# Patient Record
Sex: FEMALE | Race: WHITE | Employment: PART TIME | URBAN - METROPOLITAN AREA
[De-identification: names, ages, dates, MRNs, and addresses within clinical notes are randomized per-mention and may not be internally consistent; named-entity substitution may affect disease eponyms.]

---

## 2020-12-06 PROBLEM — O48.0 POST-DATES PREGNANCY: Status: ACTIVE | Noted: 2020-12-06

## 2020-12-06 PROBLEM — Z37.9 NORMAL LABOR: Status: ACTIVE | Noted: 2020-12-06

## 2020-12-08 PROBLEM — O48.0 POST-DATES PREGNANCY: Status: RESOLVED | Noted: 2020-12-06 | Resolved: 2020-12-08

## 2020-12-08 PROBLEM — Z37.9 NORMAL LABOR: Status: RESOLVED | Noted: 2020-12-06 | Resolved: 2020-12-08

## 2021-10-28 ENCOUNTER — OFFICE VISIT (OUTPATIENT)
Dept: ORTHOPEDIC SURGERY | Age: 41
End: 2021-10-28
Payer: MEDICAID

## 2021-10-28 ENCOUNTER — HOSPITAL ENCOUNTER (OUTPATIENT)
Dept: OCCUPATIONAL MEDICINE | Age: 41
Discharge: HOME | End: 2021-10-28
Attending: FAMILY MEDICINE

## 2021-10-28 VITALS
HEART RATE: 76 BPM | WEIGHT: 173.2 LBS | RESPIRATION RATE: 16 BRPM | HEIGHT: 72 IN | BODY MASS INDEX: 23.46 KG/M2 | OXYGEN SATURATION: 94 %

## 2021-10-28 DIAGNOSIS — M77.12 LATERAL EPICONDYLITIS OF LEFT ELBOW: Primary | ICD-10-CM

## 2021-10-28 DIAGNOSIS — M77.12 LATERAL EPICONDYLITIS OF LEFT ELBOW: ICD-10-CM

## 2021-10-28 PROCEDURE — 99204 OFFICE O/P NEW MOD 45 MIN: CPT | Performed by: FAMILY MEDICINE

## 2021-10-28 RX ORDER — DICLOFENAC SODIUM 10 MG/G
2 GEL TOPICAL
Qty: 200 G | Refills: 2 | Status: SHIPPED | OUTPATIENT
Start: 2021-10-28

## 2021-10-28 NOTE — PATIENT INSTRUCTIONS
Search YouTube for my channel:    Dr. Jacob Redman      Tennis Elbow: Exercises  Introduction  Here are some examples of exercises for you to try. The exercises may be suggested for a condition or for rehabilitation. Start each exercise slowly. Ease off the exercises if you start to have pain. You will be told when to start these exercises and which ones will work best for you. How to do the exercises  Wrist flexor stretch    1. Extend your arm in front of you with your palm up. 2. Bend your wrist, pointing your hand toward the floor. 3. With your other hand, gently bend your wrist farther until you feel a mild to moderate stretch in your forearm. 4. Hold for at least 15 to 30 seconds. Repeat 2 to 4 times. Wrist extensor stretch    1. Repeat steps 1 to 4 of the stretch above but begin with your extended hand palm down. Ball or sock squeeze    1. Hold a tennis ball (or a rolled-up sock) in your hand. 2. Make a fist around the ball (or sock) and squeeze. 3. Hold for about 6 seconds, and then relax for up to 10 seconds. 4. Repeat 8 to 12 times. 5. Switch the ball (or sock) to your other hand and do 8 to 12 times. Wrist deviation    1. Sit so that your arm is supported but your hand hangs off the edge of a flat surface, such as a table. 2. Hold your hand out like you are shaking hands with someone. 3. Move your hand up and down. 4. Repeat this motion 8 to 12 times. 5. Switch arms. 6. Try to do this exercise twice with each hand. Wrist curls    1. Place your forearm on a table with your hand hanging over the edge of the table, palm up. 2. Place a 1- to 2-pound weight in your hand. This may be a dumbbell, a can of food, or a filled water bottle. 3. Slowly raise and lower the weight while keeping your forearm on the table and your palm facing up. 4. Repeat this motion 8 to 12 times. 5. Switch arms, and do steps 1 through 4.  6. Repeat with your hand facing down toward the floor. Switch arms.   Biceps curls    1. Sit leaning forward with your legs slightly spread and your left hand on your left thigh. 2. Place your right elbow on your right thigh, and hold the weight with your forearm horizontal.  3. Slowly curl the weight up and toward your chest.  4. Repeat this motion 8 to 12 times. 5. Switch arms, and do steps 1 through 4. Follow-up care is a key part of your treatment and safety. Be sure to make and go to all appointments, and call your doctor if you are having problems. It's also a good idea to know your test results and keep a list of the medicines you take. Where can you learn more? Go to http://www.gray.com/  Enter [address]215 in the search box to learn more about \"Tennis Elbow: Exercises. \"  Current as of: July 1, 2021               Content Version: 13.0  © 7275-9103 [employer]. Care instructions adapted under license by [employer] (which disclaims liability or warranty for this information). If you have questions about a medical condition or this instruction, always ask your healthcare professional. [address] any warranty or liability for your use of this information.

## 2021-10-28 NOTE — PROGRESS NOTES
AVS reviewed: YES  HEP: AT reviewed  Resources Provided: YES Printed Photos  Patient questions/concerns answered: NO pt declined questions or concerns   Patient verbalized understanding of treatment plan: YES

## 2021-10-28 NOTE — PROGRESS NOTES
HISTORY OF PRESENT ILLNESS    Arcelia Ayala 1980 is a 37y.o. year old female comes in today as new patient for: left elbow pain    Patients symptoms have been present for 6 weeks after injury rock climbing. 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 Pain level 2/10 left elbow lateral, worse with lift palm down. It is unchanged with rest/ice. IMAGING: XR left elbow pending    Past Surgical History:   Procedure Laterality Date    HX BREAST AUGMENTATION  2000    UPPER ARM/ELBOW SURGERY UNLISTED      bicept tendon repair     Social History     Socioeconomic History    Marital status: UNKNOWN     Spouse name: Not on file    Number of children: Not on file    Years of education: Not on file    Highest education level: Not on file   Tobacco Use    Smoking status: Never Smoker    Smokeless tobacco: Never Used   Vaping Use    Vaping Use: Never used   Substance and Sexual Activity    Alcohol use: Not Currently    Drug use: Not Currently     Social Determinants of Health     Financial Resource Strain:     Difficulty of Paying Living Expenses:    Food Insecurity:     Worried About Running Out of Food in the Last Year:     Ran Out of Food in the Last Year:    Transportation Needs:     Lack of Transportation (Medical):  Lack of Transportation (Non-Medical):    Physical Activity:     Days of Exercise per Week:     Minutes of Exercise per Session:    Stress:     Feeling of Stress :    Social Connections:     Frequency of Communication with Friends and Family:     Frequency of Social Gatherings with Friends and Family:     Attends Buddhist Services:     Active Member of Clubs or Organizations:     Attends Club or Organization Meetings:     Marital Status:       Current Outpatient Medications   Medication Sig Dispense Refill    prenatal multivit-ca-min-fe-fa tab Take  by mouth. History reviewed. No pertinent past medical history.   No family history on file.      ROS:  No swell, bruise, numb      Objective:  Pulse 76   Resp 16   Ht 6' (1.829 m)   Wt 173 lb 3.2 oz (78.6 kg)   SpO2 94%   BMI 23.49 kg/m²   NEURO:  Sensation intact light touch upper and lower extremities. Biceps & Triceps reflexes +2/4 bilaterally. right hand dominant. Spurling Negative bilateral   M/S:  left elbow/wrist: Negative angel tenderness. Phalen's negative. Tinel's negative. Strength +5/5 bilateral .  Piano key sign Negative bilateral .  Carpal bone motion normal.  Finklestein's negative  TFCC Load Test negative. LEFT lateral epicondyle(s) with TTP worsened with wrist extension. negative muscular atrophy. Assessment/Plan:     ICD-10-CM ICD-9-CM    1. Lateral epicondylitis of left elbow  M77.12 726.32 XR ELBOW LT AP/LAT      AMB SUPPLY ORDER      diclofenac (VOLTAREN) 1 % gel       Patient (or guardian if minor) verbalizes understanding of evaluation and plan. Will start brace, HEP and Rx for voltaren gel as above and plan follow-up 4 weeks. Total time spent on encounter including chart/imaging/lab review and evaluation/documentation/demo home program/coordination of care/form completion but not including time for any procedures/manipulation 49 minutes.